# Patient Record
Sex: FEMALE | Race: WHITE | NOT HISPANIC OR LATINO | URBAN - METROPOLITAN AREA
[De-identification: names, ages, dates, MRNs, and addresses within clinical notes are randomized per-mention and may not be internally consistent; named-entity substitution may affect disease eponyms.]

---

## 2018-09-07 ENCOUNTER — OUTPATIENT (OUTPATIENT)
Dept: OUTPATIENT SERVICES | Facility: HOSPITAL | Age: 28
LOS: 1 days | Discharge: HOME | End: 2018-09-07

## 2018-09-07 DIAGNOSIS — Z01.20 ENCOUNTER FOR DENTAL EXAMINATION AND CLEANING WITHOUT ABNORMAL FINDINGS: ICD-10-CM

## 2022-03-15 ENCOUNTER — OUTPATIENT (OUTPATIENT)
Dept: OUTPATIENT SERVICES | Facility: HOSPITAL | Age: 32
LOS: 1 days | Discharge: HOME | End: 2022-03-15

## 2022-03-18 DIAGNOSIS — Z01.21 ENCOUNTER FOR DENTAL EXAMINATION AND CLEANING WITH ABNORMAL FINDINGS: ICD-10-CM

## 2022-08-05 ENCOUNTER — OUTPATIENT (OUTPATIENT)
Dept: OUTPATIENT SERVICES | Facility: HOSPITAL | Age: 32
LOS: 1 days | Discharge: HOME | End: 2022-08-05

## 2022-08-05 VITALS
TEMPERATURE: 98 F | OXYGEN SATURATION: 100 % | RESPIRATION RATE: 15 BRPM | HEIGHT: 62 IN | SYSTOLIC BLOOD PRESSURE: 90 MMHG | WEIGHT: 136.47 LBS | DIASTOLIC BLOOD PRESSURE: 52 MMHG | HEART RATE: 72 BPM

## 2022-08-05 DIAGNOSIS — K02.9 DENTAL CARIES, UNSPECIFIED: ICD-10-CM

## 2022-08-05 DIAGNOSIS — Z01.818 ENCOUNTER FOR OTHER PREPROCEDURAL EXAMINATION: ICD-10-CM

## 2022-08-05 NOTE — H&P PST ADULT - HISTORY OF PRESENT ILLNESS
31y Female presents today for presurgical testing for Complete oral rehabilitation under general anesthesia. Patient presents today with her mother accompanying her. Per patient's mother, the patient has multiple dental caries and is unable to sit comfortably at the dentist. Patient's mother also requests dental cleaning at the visit as well as it has not been completed previously. She offers no other complaints at this time. The patient is non verbal throughout the visit and appears well cared for, is well groomed and does not appear to be in any apparent distress, now for scheduled procedure.   Patient denies any CP, palpitations, SOB, cough, or dysuria. No recent URI or UTI.  Stated exercise tolerance is FOS 2-3  DEVIN screen reviewed    Patient denies any recent personal exposure to COVID19. Denies any sick contacts. Patient denies travel within the past 30 days. Patient was instructed to quarantine until after procedure.    Anesthesia Alert  NO--Difficult Airway  NO--History of neck surgery or radiation  NO--Limited ROM of neck  NO--History of Malignant hyperthermia  NO--Personal or family history of Pseudocholinesterase deficiency.  NO--Prior Anesthesia Complication  NO--Latex Allergy  NO--Loose teeth  NO--History of Rheumatoid Arthritis  NO--DEVIN  NO--Bleeding risk  NO--Other_____    Patient states that this is their complete medical history and list of medications.  Patient verbalized understanding of instructions given during this visit and was given the opportunity to ask questions and have them answered. They were instructed to follow up with their surgeon/surgeon's office with any questions regarding their procedure.

## 2022-08-05 NOTE — H&P PST ADULT - REASON FOR ADMISSION
Case Type: OP Block Time Suite: SUNG Proceduralist: Phyllis Gina Merlino  Confirmed Surgery Date Time: 08- - 0:00 PAST Date Time: 08- - 7:45 Procedure: Complete oral rehabilitation under general anesthesia  ERP?: Unavailable Laterality: Bilateral Length of Procedure: 180 Minutes  Anesthesia Type: General

## 2022-08-05 NOTE — H&P PST ADULT - NSICDXPASTMEDICALHX_GEN_ALL_CORE_FT
PAST MEDICAL HISTORY:  Autism     History of seizure disorder     Moderate intellectual disability

## 2022-08-05 NOTE — H&P PST ADULT - NSICDXFAMILYHX_GEN_ALL_CORE_FT
FAMILY HISTORY:  Grandparent  Still living? Yes, Estimated age: Age Unknown  FH: stroke, Age at diagnosis: Age Unknown

## 2022-08-19 ENCOUNTER — OUTPATIENT (OUTPATIENT)
Dept: OUTPATIENT SERVICES | Facility: HOSPITAL | Age: 32
LOS: 1 days | Discharge: HOME | End: 2022-08-19

## 2022-08-19 ENCOUNTER — TRANSCRIPTION ENCOUNTER (OUTPATIENT)
Age: 32
End: 2022-08-19

## 2022-08-19 VITALS
SYSTOLIC BLOOD PRESSURE: 90 MMHG | HEART RATE: 71 BPM | RESPIRATION RATE: 20 BRPM | DIASTOLIC BLOOD PRESSURE: 57 MMHG | OXYGEN SATURATION: 96 %

## 2022-08-19 VITALS
DIASTOLIC BLOOD PRESSURE: 55 MMHG | HEART RATE: 77 BPM | HEIGHT: 62 IN | SYSTOLIC BLOOD PRESSURE: 94 MMHG | RESPIRATION RATE: 20 BRPM | OXYGEN SATURATION: 99 % | WEIGHT: 136.47 LBS | TEMPERATURE: 98 F

## 2022-08-19 DIAGNOSIS — K02.9 DENTAL CARIES, UNSPECIFIED: ICD-10-CM

## 2022-08-19 RX ORDER — TOPIRAMATE 25 MG
1 TABLET ORAL
Qty: 0 | Refills: 0 | DISCHARGE

## 2022-08-19 RX ORDER — ONDANSETRON 8 MG/1
4 TABLET, FILM COATED ORAL ONCE
Refills: 0 | Status: DISCONTINUED | OUTPATIENT
Start: 2022-08-19 | End: 2022-09-02

## 2022-08-19 RX ORDER — QUETIAPINE FUMARATE 200 MG/1
1 TABLET, FILM COATED ORAL
Qty: 0 | Refills: 0 | DISCHARGE

## 2022-08-19 RX ORDER — PINDOLOL 10 MG
1 TABLET ORAL
Qty: 0 | Refills: 0 | DISCHARGE

## 2022-08-19 RX ORDER — LAMOTRIGINE 25 MG/1
250 TABLET, ORALLY DISINTEGRATING ORAL
Qty: 0 | Refills: 0 | DISCHARGE

## 2022-08-19 RX ORDER — SERTRALINE 25 MG/1
0.5 TABLET, FILM COATED ORAL
Qty: 0 | Refills: 0 | DISCHARGE

## 2022-08-19 RX ORDER — SODIUM CHLORIDE 9 MG/ML
1000 INJECTION, SOLUTION INTRAVENOUS
Refills: 0 | Status: DISCONTINUED | OUTPATIENT
Start: 2022-08-19 | End: 2022-09-02

## 2022-08-19 RX ORDER — ALPRAZOLAM 0.25 MG
0.12 TABLET ORAL
Qty: 0 | Refills: 0 | DISCHARGE

## 2022-08-19 RX ORDER — HYDROMORPHONE HYDROCHLORIDE 2 MG/ML
0.5 INJECTION INTRAMUSCULAR; INTRAVENOUS; SUBCUTANEOUS
Refills: 0 | Status: DISCONTINUED | OUTPATIENT
Start: 2022-08-19 | End: 2022-08-19

## 2022-08-19 RX ORDER — DEXAMETHASONE 0.5 MG/5ML
8 ELIXIR ORAL ONCE
Refills: 0 | Status: DISCONTINUED | OUTPATIENT
Start: 2022-08-19 | End: 2022-09-02

## 2022-08-19 RX ORDER — METOCLOPRAMIDE HCL 10 MG
10 TABLET ORAL ONCE
Refills: 0 | Status: DISCONTINUED | OUTPATIENT
Start: 2022-08-19 | End: 2022-09-02

## 2022-08-19 RX ADMIN — SODIUM CHLORIDE 100 MILLILITER(S): 9 INJECTION, SOLUTION INTRAVENOUS at 15:46

## 2022-08-19 NOTE — ASU DISCHARGE PLAN (ADULT/PEDIATRIC) - CALL YOUR DOCTOR IF YOU HAVE ANY OF THE FOLLOWING:
Bleeding that does not stop/Swelling that gets worse/Fever greater than (need to indicate Fahrenheit or Celsius)/Nausea and vomiting that does not stop/Unable to urinate/Excessive diarrhea

## 2022-08-19 NOTE — CHART NOTE - NSCHARTNOTEFT_GEN_A_CORE
PACU ANESTHESIA ADMISSION NOTE      Procedure: Dental examination with x-ray imaging, dental cleaning, and restoration      Post op diagnosis:  Dental caries        ____  Intubated  TV:______       Rate: ______      FiO2: ______    ___x_  Patent Airway    __x__  Full return of protective reflexes    __x__  Full recovery from anesthesia / back to baseline     Vitals:   T:    98.8       R:         12         BP:      98/69            Sat:        100           P: 67      Mental Status:  ___x_ Awake   __x___ Alert   _____ Drowsy   _____ Sedated    Nausea/Vomiting:  ___x_ NO  ______Yes,   See Post - Op Orders          Pain Scale (0-10):  _____    Treatment: ____ None    _x___ See Post - Op/PCA Orders    Post - Operative Fluids:   ____ Oral   ____ See Post - Op Orders    Plan: Discharge:   __x__Home       _____Floor     _____Critical Care    _____  Other:_________________    Comments: Procedure completed without anesthetic complication, no c/o pain/discomfort at this time.

## 2022-08-19 NOTE — PRE-ANESTHESIA EVALUATION ADULT - NSANTHSNORERD_ENT_A_CORE
No [Constipation] : constipation [Joint Pain] : joint pain [Back Pain] : back pain [Diarrhea] : diarrhea [Itching] : no itching [Negative] : Eyes [FreeTextEntry4] : as noted in HPI   [de-identified] : as noted in HPI

## 2022-08-19 NOTE — ASU DISCHARGE PLAN (ADULT/PEDIATRIC) - FOLLOW UP APPOINTMENTS
[facility]:  Ambulatory Surgery [facility]:  [facility] for Amb[facility]

## 2022-08-19 NOTE — BRIEF OPERATIVE NOTE - NSICDXBRIEFPROCEDURE_GEN_ALL_CORE_FT
PROCEDURES:  Dental examination with x-ray imaging, dental cleaning, and restoration 19-Aug-2022 13:49:54  Merlino, Phyllis G

## 2022-08-19 NOTE — ASU DISCHARGE PLAN (ADULT/PEDIATRIC) - CARE PROVIDER_API CALL
Merlino, Phyllis G (DDS)  Pediatric Dental Medicine  [address] [place] [address]  Phone: [phone]  Fax: [phone]  Follow Up Time:

## 2022-08-19 NOTE — ASU DISCHARGE PLAN (ADULT/PEDIATRIC) - SPECIFY DIET AND FLUID
soft food as tolerated, drink to stay hydrated  no straw for 24 hours, avoid spitting, rinsing, bottle use for 24 hours

## 2022-08-19 NOTE — ASU DISCHARGE PLAN (ADULT/PEDIATRIC) - NS MD DC FALL RISK RISK
For information on Fall & Injury Prevention, visit: [email or web address] OR  [email or web address] OR  https://www.cdc.gov/steadi/patient.html

## 2022-08-19 NOTE — BRIEF OPERATIVE NOTE - OPERATION/FINDINGS
Complete Oral Rehabilitation included:  Full mouth Exam, FMS of 22 xrays taken, Prophylaxis and  Fluoride treatment  Surgical Extractions with the oral surgery resident of teeth: 1 16 17 and 32.  Sockets of teeth 1, 17 and 32 had gelfoam placed and closed with 3-0 chromic sutures.     Composite restorations of teeth: 3, 31  Amalgam restorations of teeth: 18

## 2022-08-23 DIAGNOSIS — G40.909 EPILEPSY, UNSPECIFIED, NOT INTRACTABLE, WITHOUT STATUS EPILEPTICUS: ICD-10-CM

## 2022-08-23 DIAGNOSIS — F84.0 AUTISTIC DISORDER: ICD-10-CM

## 2022-08-23 DIAGNOSIS — K02.9 DENTAL CARIES, UNSPECIFIED: ICD-10-CM

## 2022-08-23 DIAGNOSIS — Z88.2 ALLERGY STATUS TO SULFONAMIDES: ICD-10-CM

## 2022-08-23 DIAGNOSIS — F84.9 PERVASIVE DEVELOPMENTAL DISORDER, UNSPECIFIED: ICD-10-CM

## 2022-08-24 ENCOUNTER — OUTPATIENT (OUTPATIENT)
Dept: OUTPATIENT SERVICES | Facility: HOSPITAL | Age: 32
LOS: 1 days | Discharge: HOME | End: 2022-08-24

## 2022-08-24 PROBLEM — F84.0 AUTISTIC DISORDER: Chronic | Status: ACTIVE | Noted: 2022-08-05

## 2022-08-24 PROBLEM — Z86.69 PERSONAL HISTORY OF OTHER DISEASES OF THE NERVOUS SYSTEM AND SENSE ORGANS: Chronic | Status: ACTIVE | Noted: 2022-08-05

## 2022-08-24 PROBLEM — F71 MODERATE INTELLECTUAL DISABILITIES: Chronic | Status: ACTIVE | Noted: 2022-08-05

## 2022-08-26 DIAGNOSIS — Z98.818 OTHER DENTAL PROCEDURE STATUS: ICD-10-CM

## 2023-07-14 NOTE — H&P PST ADULT - TEACHING/LEARNING DEVELOPMENTAL CONSIDERATIONS OTHER
Head,  normocephalic,  atraumatic,  Face,  Face within normal limits,  Ears,  External ears within normal limits,  Nose/Nasopharynx,  External nose  normal appearance,  nares patent,  no nasal discharge,  Mouth and Throat,  Oral cavity appearance normal,  Lips,  Appearance normal  none

## 2023-08-20 ENCOUNTER — NON-APPOINTMENT (OUTPATIENT)
Age: 33
End: 2023-08-20

## 2023-08-22 ENCOUNTER — OUTPATIENT (OUTPATIENT)
Dept: OUTPATIENT SERVICES | Facility: HOSPITAL | Age: 33
LOS: 1 days | End: 2023-08-22
Payer: MEDICAID

## 2023-08-22 ENCOUNTER — APPOINTMENT (OUTPATIENT)
Dept: NEUROLOGY | Facility: CLINIC | Age: 33
End: 2023-08-22
Payer: MEDICAID

## 2023-08-22 VITALS
BODY MASS INDEX: 24.66 KG/M2 | TEMPERATURE: 96.8 F | HEIGHT: 62 IN | OXYGEN SATURATION: 98 % | DIASTOLIC BLOOD PRESSURE: 68 MMHG | SYSTOLIC BLOOD PRESSURE: 96 MMHG | WEIGHT: 134 LBS | HEART RATE: 91 BPM

## 2023-08-22 VITALS — SYSTOLIC BLOOD PRESSURE: 96 MMHG | HEART RATE: 89 BPM | DIASTOLIC BLOOD PRESSURE: 66 MMHG

## 2023-08-22 DIAGNOSIS — Z00.00 ENCOUNTER FOR GENERAL ADULT MEDICAL EXAMINATION WITHOUT ABNORMAL FINDINGS: ICD-10-CM

## 2023-08-22 PROCEDURE — 99202 OFFICE O/P NEW SF 15 MIN: CPT

## 2023-08-22 PROCEDURE — 99203 OFFICE O/P NEW LOW 30 MIN: CPT

## 2023-08-22 RX ORDER — QUETIAPINE FUMARATE 150 MG/1
150 TABLET, FILM COATED ORAL
Qty: 180 | Refills: 0 | Status: ACTIVE | COMMUNITY
Start: 2023-08-22 | End: 1900-01-01

## 2023-08-23 NOTE — ASSESSMENT
[FreeTextEntry1] : 32 years old female with a PMHx of Autism, intellectual disability, and seizure disorder diagnosed in her teens (mostly staring episodes) presented to the office to transfer care from Dr. Max's office after he retired. According to the patient's mother the patient has been having staring events of 2-5 minutes duration pravin other month consistently for years.   #Seizure disorder - Poorly controlled (seizures every other month) - Started on Clobazam 5mg at bedtime - CBC, CMP and medication levels ordered - Continue home meds - Elective EMU admission discussed with the family, they are amenable to it, but they think it would be challenging to manage given the patint's behavioral difficultes - f/u in 3 months to re-evaluate clobazam response and possible elective admission for medication titration

## 2023-08-23 NOTE — HISTORY OF PRESENT ILLNESS
[FreeTextEntry1] : 32 years old female with a PMHx of Autism, intellectual disability, and seizure disorder diagnosed in her teens (mostly staring episodes) presented to the office to transfer care from Dr. Max's office after he retired. According to the patient's mother the patient has been having staring events of 2-5 minutes duration pravin other month consistently for years. Pt had a 90 min EEG performed back in 2013 that showed occasional R mid temporal spikes and rare mid temporal spikes.   Pt has been taking Lamictal 250mg BID, Seroquel 150mg BID and Topamax 100XR, and Xanax 0.25mg qd PRN  for years. Due to insurance issues her Topamax was changed to 150mg non-extended release.

## 2023-08-23 NOTE — PHYSICAL EXAM
[General Appearance - Alert] : alert [General Appearance - Well Nourished] : well nourished [General Appearance - Well-Appearing] : healthy appearing [Person] : oriented to person [Cranial Nerves Optic (II)] : visual acuity intact bilaterally,  visual fields full to confrontation, pupils equal round and reactive to light [Cranial Nerves Oculomotor (III)] : extraocular motion intact [Cranial Nerves Trigeminal (V)] : facial sensation intact symmetrically [Cranial Nerves Facial (VII)] : face symmetrical [Cranial Nerves Vestibulocochlear (VIII)] : hearing was intact bilaterally [Cranial Nerves Glossopharyngeal (IX)] : tongue and palate midline [Cranial Nerves Accessory (XI - Cranial And Spinal)] : head turning and shoulder shrug symmetric [Cranial Nerves Hypoglossal (XII)] : there was no tongue deviation with protrusion [Motor Tone] : muscle tone was normal in all four extremities [Motor Strength] : muscle strength was normal in all four extremities [Sensation Tactile Decrease] : light touch was intact [2+] : Ankle jerk left 2+ [Sclera] : the sclera and conjunctiva were normal [PERRL With Normal Accommodation] : pupils were equal in size, round, reactive to light, with normal accommodation [Extraocular Movements] : extraocular movements were intact [Outer Ear] : the ears and nose were normal in appearance [Oropharynx] : the oropharynx was normal [Neck Appearance] : the appearance of the neck was normal [Neck Cervical Mass (___cm)] : no neck mass was observed [Jugular Venous Distention Increased] : there was no jugular-venous distention [Thyroid Diffuse Enlargement] : the thyroid was not enlarged [Thyroid Nodule] : there were no palpable thyroid nodules [Auscultation Breath Sounds / Voice Sounds] : lungs were clear to auscultation bilaterally [Heart Rate And Rhythm] : heart rate was normal and rhythm regular [Heart Sounds] : normal S1 and S2 [Heart Sounds Gallop] : no gallops [Murmurs] : no murmurs [Heart Sounds Pericardial Friction Rub] : no pericardial rub [Full Pulse] : the pedal pulses are present [Edema] : there was no peripheral edema [Bowel Sounds] : normal bowel sounds [Abdomen Soft] : soft [Abdomen Tenderness] : non-tender [] : no hepato-splenomegaly [Abdomen Mass (___ Cm)] : no abdominal mass palpated [FreeTextEntry1] : Neurological exam difficulted by patient's intellectual disability. Patient did not participate fully in exam.  No deficits on CN exam Spontaneous movement observed on both U and L extremities. Preserve strength in all limbs 2+ reflexes U and L extremities [Motor Strength Upper Extremities Bilaterally] : strength was normal in both upper extremities [Motor Strength Lower Extremities Bilaterally] : strength was normal in both lower extremities

## 2023-08-23 NOTE — END OF VISIT
[] : Resident [FreeTextEntry3] : I visited the patient with resident. Agree with history, physical exam and plan as above.  f Autism, intellectual disability, and seizure disorder diagnosed in her teens (mostly staring episodes) presented to the office to transfer care from Dr. Max's office. Seizure are not fully controlled. Would be challenging to admit the patient for VEEG given the condition. Recommended to add Onfi 5 mg qhs. Continuue the rest of AEDs. RTC in 3-4  months

## 2023-08-23 NOTE — REVIEW OF SYSTEMS
[Difficulty with Language] : ~M difficulty with language [Repeating Questions] : repeated questioning about recent events [Seizures] : convulsions [As Noted in HPI] : as noted in HPI [Negative] : ENT [Facial Weakness] : no facial weakness [Arm Weakness] : no arm weakness [Hand Weakness] : no hand weakness [Leg Weakness] : no leg weakness [Poor Coordination] : good coordination

## 2023-08-24 DIAGNOSIS — R56.9 UNSPECIFIED CONVULSIONS: ICD-10-CM

## 2023-08-24 RX ORDER — CLOBAZAM 10 MG/1
10 TABLET ORAL
Qty: 30 | Refills: 2 | Status: ACTIVE | COMMUNITY
Start: 2023-08-22

## 2024-03-26 ENCOUNTER — APPOINTMENT (OUTPATIENT)
Dept: NEUROLOGY | Facility: CLINIC | Age: 34
End: 2024-03-26
Payer: MEDICARE

## 2024-03-26 ENCOUNTER — OUTPATIENT (OUTPATIENT)
Dept: OUTPATIENT SERVICES | Facility: HOSPITAL | Age: 34
LOS: 1 days | End: 2024-03-26
Payer: MEDICARE

## 2024-03-26 VITALS
BODY MASS INDEX: 24.29 KG/M2 | OXYGEN SATURATION: 100 % | HEIGHT: 62 IN | HEART RATE: 83 BPM | WEIGHT: 132 LBS | DIASTOLIC BLOOD PRESSURE: 66 MMHG | SYSTOLIC BLOOD PRESSURE: 91 MMHG

## 2024-03-26 DIAGNOSIS — F79 UNSPECIFIED INTELLECTUAL DISABILITIES: ICD-10-CM

## 2024-03-26 DIAGNOSIS — R56.9 UNSPECIFIED CONVULSIONS: ICD-10-CM

## 2024-03-26 DIAGNOSIS — Z00.00 ENCOUNTER FOR GENERAL ADULT MEDICAL EXAMINATION WITHOUT ABNORMAL FINDINGS: ICD-10-CM

## 2024-03-26 DIAGNOSIS — F84.0 AUTISTIC DISORDER: ICD-10-CM

## 2024-03-26 PROCEDURE — 99213 OFFICE O/P EST LOW 20 MIN: CPT

## 2024-03-26 RX ORDER — ALPRAZOLAM 0.25 MG/1
0.25 TABLET ORAL DAILY
Qty: 30 | Refills: 2 | Status: DISCONTINUED | COMMUNITY
Start: 2023-08-22 | End: 2024-03-26

## 2024-03-26 RX ORDER — LAMOTRIGINE 250 MG/1
250 TABLET, EXTENDED RELEASE ORAL TWICE DAILY
Qty: 60 | Refills: 5 | Status: ACTIVE | COMMUNITY
Start: 2024-03-26 | End: 1900-01-01

## 2024-03-26 RX ORDER — TOPIRAMATE 150 MG/1
150 CAPSULE, EXTENDED RELEASE ORAL
Qty: 120 | Refills: 3 | Status: DISCONTINUED | COMMUNITY
Start: 2023-08-22 | End: 2024-03-26

## 2024-03-26 RX ORDER — TOPIRAMATE 100 MG/1
100 CAPSULE, EXTENDED RELEASE ORAL
Qty: 60 | Refills: 0 | Status: ACTIVE | COMMUNITY
Start: 2024-03-26 | End: 1900-01-01

## 2024-03-26 NOTE — HISTORY OF PRESENT ILLNESS
[FreeTextEntry1] : 33 year old female with a PMHx of Autism, intellectual disability, and seizure disorder diagnosed in her teens (mostly staring episodes) presented to the office to transfer care from Dr. Max's office after he retired. According to the patient's mother the patient has been having staring events of 1-2 minutes duration pravin other month consistently for years, she would also reaching and pulling for something. Pt had a 90 min EEG performed back in 2013 that showed occasional R mid temporal spikes and rare mid temporal spikes. She last had an EEG around 2018. She has not had an elective admission for VEEG monitoring since then.  Pt has been taking Lamictal 250 mg BID, Seroquel 150 mg BID and Topamax 100 XR BID. Due to insurance issues her Topamax was changed to 150 mg non-extended release. She was at one point switched from XR to another formulation for lamictal and topamax, but she has since been put back on the extended release.  She was last prescribed Onfi 5 mg daily, and the mother noticed that the patient was more irritable and more agitated. She became less irritable after the mother stopped the medication after a few months.  She has a seizure roughly about once per month. She has not had a seizure for 2 - 3 months. Denies any urinary incontinence, fecal incontinence, tongue biting.

## 2024-03-26 NOTE — ASSESSMENT
[FreeTextEntry1] : This is a 32 years old female with a PMHx of Autism, intellectual disability, and seizure disorder diagnosed in her teens (mostly staring episodes) presented to the office to transfer care from Dr. Max's office after he retired. According to the patient's mother the patient has been having staring events of 2-5 minutes duration pravin other month consistently for years, although has been seizure free for the past 2-3 months.  Plan: #Seizure disorder - Continue home meds, will re-fill Lamictal 250 mg XR BID and Topamax 100 mg XR BID - Continue off clobazam - no need for EMU admission at this time due to seizure control - F/u in 6 months

## 2024-03-26 NOTE — PHYSICAL EXAM
[General Appearance - Alert] : alert [General Appearance - Well Nourished] : well nourished [General Appearance - In No Acute Distress] : in no acute distress [Person] : oriented to person [Cranial Nerves Optic (II)] : visual acuity intact bilaterally,  visual fields full to confrontation, pupils equal round and reactive to light [Cranial Nerves Oculomotor (III)] : extraocular motion intact [Cranial Nerves Vestibulocochlear (VIII)] : hearing was intact bilaterally [Cranial Nerves Trigeminal (V)] : facial sensation intact symmetrically [Cranial Nerves Facial (VII)] : face symmetrical [Cranial Nerves Accessory (XI - Cranial And Spinal)] : head turning and shoulder shrug symmetric [Cranial Nerves Glossopharyngeal (IX)] : tongue and palate midline [Motor Strength] : muscle strength was normal in all four extremities [Cranial Nerves Hypoglossal (XII)] : there was no tongue deviation with protrusion [Motor Handedness Right-Handed] : the patient is right hand dominant [Abnormal Walk] : normal gait [2+] : Ankle jerk left 2+ [Extraocular Movements] : extraocular movements were intact [PERRL With Normal Accommodation] : pupils were equal in size, round, reactive to light, with normal accommodation [FreeTextEntry1] : Neurological exam difficulted by patient's intellectual disability. Patient did not participate fully in exam. [Paresis Pronator Drift Right-Sided] : no pronator drift on the right [Paresis Pronator Drift Left-Sided] : no pronator drift on the left [Motor Strength Upper Extremities Bilaterally] : strength was normal in both upper extremities [Motor Strength Lower Extremities Bilaterally] : strength was normal in both lower extremities

## 2024-03-28 DIAGNOSIS — F79 UNSPECIFIED INTELLECTUAL DISABILITIES: ICD-10-CM

## 2024-03-28 DIAGNOSIS — F84.0 AUTISTIC DISORDER: ICD-10-CM

## 2024-03-28 DIAGNOSIS — R56.9 UNSPECIFIED CONVULSIONS: ICD-10-CM

## 2024-05-31 NOTE — REVIEW OF SYSTEMS
10 [As Noted in HPI] : as noted in HPI [Negative] : Integumentary

## 2024-10-01 ENCOUNTER — OUTPATIENT (OUTPATIENT)
Dept: OUTPATIENT SERVICES | Facility: HOSPITAL | Age: 34
LOS: 1 days | End: 2024-10-01
Payer: MEDICARE

## 2024-10-01 ENCOUNTER — APPOINTMENT (OUTPATIENT)
Dept: NEUROLOGY | Facility: CLINIC | Age: 34
End: 2024-10-01
Payer: MEDICARE

## 2024-10-01 VITALS — HEART RATE: 85 BPM | SYSTOLIC BLOOD PRESSURE: 135 MMHG | OXYGEN SATURATION: 100 % | DIASTOLIC BLOOD PRESSURE: 89 MMHG

## 2024-10-01 DIAGNOSIS — R56.9 UNSPECIFIED CONVULSIONS: ICD-10-CM

## 2024-10-01 DIAGNOSIS — Z00.00 ENCOUNTER FOR GENERAL ADULT MEDICAL EXAMINATION WITHOUT ABNORMAL FINDINGS: ICD-10-CM

## 2024-10-01 PROCEDURE — 99215 OFFICE O/P EST HI 40 MIN: CPT

## 2024-10-01 PROCEDURE — G2211 COMPLEX E/M VISIT ADD ON: CPT

## 2024-10-01 RX ORDER — TOPIRAMATE 100 MG/1
100 CAPSULE, EXTENDED RELEASE ORAL AT BEDTIME
Qty: 7 | Refills: 5 | Status: ACTIVE | COMMUNITY
Start: 2024-10-01 | End: 1900-01-01

## 2024-10-02 DIAGNOSIS — R56.9 UNSPECIFIED CONVULSIONS: ICD-10-CM

## 2024-10-02 NOTE — END OF VISIT
[] : Resident [FreeTextEntry3] : Discussed with mother about medication changes She has been givign extra topamax at night left over from prior prescription as patients seizures more commonly in morning Seizures also more likely to occur around her periods Plan as above [Time Spent: ___ minutes] : I have spent [unfilled] minutes of time on the encounter which excludes teaching and separately reported services.

## 2024-10-02 NOTE — PHYSICAL EXAM
[FreeTextEntry1] : Neurological exam difficult due to patient's inability to participate fully in exam. Constitutional: alert, in no acute distress. Neurologic:   Orientation: oriented to person.   Cranial Nerves: visual acuity intact bilaterally, visual fields full to confrontation, pupils equal round and reactive to light, extraocular motion intact, facial sensation intact symmetrically, face symmetrical, hearing was intact bilaterally. Motor: muscle strength was normal in all four extremities.   Motor Strength: the patient is right hand dominant. no pronator drift on the right. no pronator drift on the left. strength was normal in both upper extremities. strength was normal in both lower extremities.   Coordination: normal gait.   Deep tendon reflexes: Biceps right 2+. Biceps left 2+.  Triceps right 2+. Triceps left 2+.  Brachioradialis right 2+. Brachioradialis left 2+.  Patella right 2+. Patella left 2+.  Ankle jerk right 2+. Ankle jerk left 2+.

## 2024-10-02 NOTE — HISTORY OF PRESENT ILLNESS
[FreeTextEntry1] : 34 year old female with a PMHx of Autism, intellectual disability, and seizure disorder diagnosed in her teens (mostly staring episodes) presented to the office for follow-up for epilepsy.  According to the patient's mother the patient has been having staring events of 1-2 minutes duration every month consistently for years, she would also reaching and pulling for something. Pt had a 90 min EEG performed back in 2013 that showed occasional R mid temporal spikes and rare mid temporal spikes. She last had an EEG around 2018. She has not had an elective admission for VEEG monitoring since then.  Pt has been taking Lamictal 250 mg BID and Topamax 100 XR BID.  She has been tolerating this well, but because of increased events in AM, mother added a 50mg regular Topamax to her evening dose which she has tolerated well and mother feels it has helped.  Mother thinks that seizure frequency increases around menstrual period.  Otherwise no complaints.

## 2024-10-02 NOTE — ASSESSMENT
[FreeTextEntry1] : 34 year old female with a PMHx of Autism, intellectual disability, and seizure disorder diagnosed in her teens (mostly staring episodes) follow up for epilepsy.  Plan: #Seizure disorder - Continue home meds, will re-fill Lamictal 250 mg XR BID and Topamax 100 mg XR BID - Added topiramate 100mg ER to take for 7d during menstrual period - Continue off clobazam as patient did not tolerate (became agitated aggressive)  - F/u in 6 months.

## 2025-04-04 NOTE — H&P PST ADULT - HEART RATE (BEATS/MIN)
On 4/4/2025, Alyssia DC MA scribed the services personally performed by Dr. Nikolas Liang      DOI: Insidious onset  Initial Treatment date: 03/05/2024  Date last seen: 06/06/2024  [facility]   Occupation: Unemployed  Referred by: Dr. Montrell Oviedo    SUBJECTIVE:   The patient is a pleasant 58 year old that presents to our office today for an evaluation and treatment of left shoulder and left arm pain. Patient rates her pain today at 9/10 with 10 being the worse.  Their pain range is from 7/10 to 10/10 with 10 being the worst.     At today's visit the patient rates her entire body pain a 7/10 with 10 being the worst. Patient reports pain in the lower back radiating down to the right leg. Patient states that pain increases when she walks or stands for long periods of time.     Patients current work status: unemployed  Patient states that the pain is worse when: bending back, forward and to the side, twisting, standing, coughing, sitting, walking, during exercise  Patient states to reduce pain she has tried muscle relaxer, Ibuprofen, tylenol, heat.   Patient has not sought chiropractic treatment in the past  Diagnostic imaging studies: XR cervical and shoulder   Hospitalizations:     HEALTH HISTORY:  The patients health history includes:  Diabetes type 2, hypertension    SOCIAL HISTORY:  Patient completed Chiropractic Patient History and Chiropractic Systems Review   these were reviewed with the patient.    OBJECTIVE FINDINGS:   Patient scored a 35/40 on the Functional Rating Index.  Problem focus examination revealed:  (C-spine) Cervical spine facet joint function is WNL except for her C2-3 body right facet joints that exhibited limited passive ROM and segmental restriction with tenderness upon palpation. The following muscles were examined for normal flexibility and tone; right and left upper trapezius m: right and left scalene m; right and left levator scapulae m; deep neck flexor m; right and left  SCM m; right and left suboccipital m; these muscles were WNL except for her left and right suboccipital muscle  that exhibited limited flexibility and were hypertonic at rest.    (T-Spine) Thoracic spine facet joint function is WNL except for her T4-5 facet joints T5 costovertebral joint on the that exhibited limited passive ROM and segmental restriction and tenderness upon palpation; The following muscles were examined for normal flexibility and tone; right and left rhomboid m; right and left serratus m; left and right latissimus dorsi m; These muscles were WNL except forher left and right rhomboids that exhibited limited flexibility and abnormal resting tone.    (L & P-spine) Lumbar spine facet joint function is WNL except for her   L4-5 L5-S1 facet joints that exhibited limited passive ROM and segmental restriction and tenderness on palpation; SI joint function is WNL except for her left joint that exhibited limited PROM and joint restriction; The following muscles were examined for flexibility and tone at rest; right and left hip flexor m; right and left hip adductor m; right and left hip rotator m; right and left piriformis m; right and left hamstring m; right and left Gluteus medius m and gluteus yohan m.; right and left quadratus lumborum m; left and right TFL m.: left and right internal hip rotators m; right and left quadriceps m.  These muscles were found to be WNL except for her bilateral quadratus lumborum that exhibited limited flexibility and were hypertonic at rest.    ASSESSMENT:    Costovertebral dysfunction  Segmental dysfunction cervical  Segmental dysfunction thoracic  Sacroiliitis  Segmental dysfunction lumbar  Segmental dysfunction pelvic  Segmental dysfunction sacral       PLAN:  Patient was evaluated and then treated with manipulation to her  cervical thoracic lumbar pelvic sacral via Barber distraction technique to improve function and passive ROM of facet joints.  Patient also treated with  contract/relax stretch to m noted as taut in objective findings to improve flexibility and decrease strain to spinal structures. Patient also treated with lumbar distraction x 5 minutes to stretch lumbar paraspinal m. and centralize possible contained migration of lumbar intervertebral nucleus.     Goal of care is to improve muscular and skeletal function and provide symptom relief.  Patient responded well to the treatment today. Patient rates pain at 5/10 immediately after the treatment today. Patient is to return for continued care and treatment.  Care is planned at 1 x /wk x 6 weeks.        I, Dr. Liang, personally performed the services described in this documentation as scribed by Alyssia MEHTA  in my presence and it is both accurate and complete.              72

## 2025-04-15 ENCOUNTER — APPOINTMENT (OUTPATIENT)
Dept: NEUROLOGY | Facility: CLINIC | Age: 35
End: 2025-04-15
Payer: MEDICARE

## 2025-04-15 ENCOUNTER — OUTPATIENT (OUTPATIENT)
Dept: OUTPATIENT SERVICES | Facility: HOSPITAL | Age: 35
LOS: 1 days | End: 2025-04-15
Payer: MEDICARE

## 2025-04-15 VITALS
HEART RATE: 69 BPM | BODY MASS INDEX: 22.86 KG/M2 | WEIGHT: 125 LBS | OXYGEN SATURATION: 100 % | SYSTOLIC BLOOD PRESSURE: 88 MMHG | DIASTOLIC BLOOD PRESSURE: 62 MMHG

## 2025-04-15 DIAGNOSIS — Z00.00 ENCOUNTER FOR GENERAL ADULT MEDICAL EXAMINATION WITHOUT ABNORMAL FINDINGS: ICD-10-CM

## 2025-04-15 DIAGNOSIS — R56.9 UNSPECIFIED CONVULSIONS: ICD-10-CM

## 2025-04-15 PROCEDURE — G2211 COMPLEX E/M VISIT ADD ON: CPT

## 2025-04-15 PROCEDURE — 99214 OFFICE O/P EST MOD 30 MIN: CPT

## 2025-04-17 DIAGNOSIS — R56.9 UNSPECIFIED CONVULSIONS: ICD-10-CM
